# Patient Record
Sex: FEMALE | Race: WHITE | NOT HISPANIC OR LATINO | Employment: OTHER | URBAN - METROPOLITAN AREA
[De-identification: names, ages, dates, MRNs, and addresses within clinical notes are randomized per-mention and may not be internally consistent; named-entity substitution may affect disease eponyms.]

---

## 2021-04-09 ENCOUNTER — OFFICE VISIT (OUTPATIENT)
Dept: URGENT CARE | Facility: CLINIC | Age: 66
End: 2021-04-09

## 2021-04-09 VITALS
HEART RATE: 84 BPM | TEMPERATURE: 97 F | OXYGEN SATURATION: 97 % | DIASTOLIC BLOOD PRESSURE: 94 MMHG | BODY MASS INDEX: 32.14 KG/M2 | WEIGHT: 200 LBS | SYSTOLIC BLOOD PRESSURE: 161 MMHG | HEIGHT: 66 IN

## 2021-04-09 DIAGNOSIS — G47.00 INSOMNIA, UNSPECIFIED TYPE: ICD-10-CM

## 2021-04-09 DIAGNOSIS — Z78.0 POST-MENOPAUSAL: ICD-10-CM

## 2021-04-09 DIAGNOSIS — F41.9 ANXIETY: Primary | ICD-10-CM

## 2021-04-09 PROCEDURE — 99204 PR OFFICE/OUTPT VISIT, NEW, LEVL IV, 45-59 MIN: ICD-10-PCS | Mod: S$GLB,,, | Performed by: NURSE PRACTITIONER

## 2021-04-09 PROCEDURE — 99204 OFFICE O/P NEW MOD 45 MIN: CPT | Mod: S$GLB,,, | Performed by: NURSE PRACTITIONER

## 2021-04-09 RX ORDER — SERTRALINE HYDROCHLORIDE 25 MG/1
25 TABLET, FILM COATED ORAL DAILY
Qty: 30 TABLET | Refills: 3 | Status: SHIPPED | OUTPATIENT
Start: 2021-04-09 | End: 2022-07-06

## 2021-04-09 RX ORDER — CYCLOBENZAPRINE HCL 5 MG
5 TABLET ORAL NIGHTLY PRN
Qty: 30 TABLET | Refills: 3 | Status: SHIPPED | OUTPATIENT
Start: 2021-04-09 | End: 2022-07-06

## 2021-06-03 ENCOUNTER — OFFICE VISIT (OUTPATIENT)
Dept: URGENT CARE | Facility: CLINIC | Age: 66
End: 2021-06-03
Payer: MEDICAID

## 2021-06-03 VITALS
BODY MASS INDEX: 31.21 KG/M2 | DIASTOLIC BLOOD PRESSURE: 79 MMHG | WEIGHT: 194.19 LBS | HEIGHT: 66 IN | SYSTOLIC BLOOD PRESSURE: 152 MMHG | OXYGEN SATURATION: 96 % | TEMPERATURE: 99 F | HEART RATE: 83 BPM

## 2021-06-03 DIAGNOSIS — Z13.9 ENCOUNTER FOR MEDICAL SCREENING EXAMINATION: Primary | ICD-10-CM

## 2021-06-03 PROCEDURE — 99214 PR OFFICE/OUTPT VISIT, EST, LEVL IV, 30-39 MIN: ICD-10-PCS | Mod: S$GLB,,, | Performed by: NURSE PRACTITIONER

## 2021-06-03 PROCEDURE — 99214 OFFICE O/P EST MOD 30 MIN: CPT | Mod: S$GLB,,, | Performed by: NURSE PRACTITIONER

## 2021-06-30 ENCOUNTER — OFFICE VISIT (OUTPATIENT)
Dept: FAMILY MEDICINE | Facility: CLINIC | Age: 66
End: 2021-06-30
Payer: MEDICAID

## 2021-06-30 VITALS
HEIGHT: 66 IN | BODY MASS INDEX: 31.53 KG/M2 | OXYGEN SATURATION: 97 % | TEMPERATURE: 99 F | SYSTOLIC BLOOD PRESSURE: 128 MMHG | DIASTOLIC BLOOD PRESSURE: 76 MMHG | RESPIRATION RATE: 20 BRPM | HEART RATE: 84 BPM | WEIGHT: 196.19 LBS

## 2021-06-30 DIAGNOSIS — N95.1 MENOPAUSAL VAGINAL DRYNESS: ICD-10-CM

## 2021-06-30 DIAGNOSIS — Z13.6 ENCOUNTER FOR LIPID SCREENING FOR CARDIOVASCULAR DISEASE: ICD-10-CM

## 2021-06-30 DIAGNOSIS — Z12.31 ENCOUNTER FOR SCREENING MAMMOGRAM FOR MALIGNANT NEOPLASM OF BREAST: ICD-10-CM

## 2021-06-30 DIAGNOSIS — R53.83 FATIGUE, UNSPECIFIED TYPE: ICD-10-CM

## 2021-06-30 DIAGNOSIS — Z11.59 ENCOUNTER FOR HEPATITIS C SCREENING TEST FOR LOW RISK PATIENT: ICD-10-CM

## 2021-06-30 DIAGNOSIS — Z13.220 ENCOUNTER FOR LIPID SCREENING FOR CARDIOVASCULAR DISEASE: ICD-10-CM

## 2021-06-30 DIAGNOSIS — Z12.11 COLON CANCER SCREENING: ICD-10-CM

## 2021-06-30 DIAGNOSIS — Z00.00 ENCOUNTER FOR MEDICAL EXAMINATION TO ESTABLISH CARE: Primary | ICD-10-CM

## 2021-06-30 DIAGNOSIS — Z11.4 ENCOUNTER FOR SCREENING FOR HUMAN IMMUNODEFICIENCY VIRUS (HIV): ICD-10-CM

## 2021-06-30 DIAGNOSIS — Z13.1 DIABETES MELLITUS SCREENING: ICD-10-CM

## 2021-06-30 PROCEDURE — 99205 OFFICE O/P NEW HI 60 MIN: CPT | Performed by: FAMILY MEDICINE

## 2021-06-30 PROCEDURE — 99203 OFFICE O/P NEW LOW 30 MIN: CPT | Mod: S$PBB,,, | Performed by: FAMILY MEDICINE

## 2021-06-30 PROCEDURE — 99203 PR OFFICE/OUTPT VISIT, NEW, LEVL III, 30-44 MIN: ICD-10-PCS | Mod: S$PBB,,, | Performed by: FAMILY MEDICINE

## 2021-07-12 ENCOUNTER — PATIENT MESSAGE (OUTPATIENT)
Dept: FAMILY MEDICINE | Facility: CLINIC | Age: 66
End: 2021-07-12

## 2021-07-13 ENCOUNTER — PATIENT MESSAGE (OUTPATIENT)
Dept: FAMILY MEDICINE | Facility: CLINIC | Age: 66
End: 2021-07-13

## 2021-07-15 ENCOUNTER — HOSPITAL ENCOUNTER (OUTPATIENT)
Dept: RADIOLOGY | Facility: HOSPITAL | Age: 66
Discharge: HOME | End: 2021-07-15
Attending: FAMILY MEDICINE
Payer: MEDICAID

## 2021-07-15 VITALS — HEIGHT: 66 IN | BODY MASS INDEX: 31.67 KG/M2

## 2021-07-15 DIAGNOSIS — Z12.31 ENCOUNTER FOR SCREENING MAMMOGRAM FOR MALIGNANT NEOPLASM OF BREAST: ICD-10-CM

## 2021-07-15 PROCEDURE — 77067 SCR MAMMO BI INCL CAD: CPT | Mod: TC,PO

## 2021-07-22 ENCOUNTER — TELEPHONE (OUTPATIENT)
Dept: FAMILY MEDICINE | Facility: CLINIC | Age: 66
End: 2021-07-22

## 2022-07-06 ENCOUNTER — OFFICE VISIT (OUTPATIENT)
Dept: URGENT CARE | Facility: CLINIC | Age: 67
End: 2022-07-06
Payer: COMMERCIAL

## 2022-07-06 VITALS
SYSTOLIC BLOOD PRESSURE: 135 MMHG | TEMPERATURE: 98 F | HEIGHT: 66 IN | DIASTOLIC BLOOD PRESSURE: 80 MMHG | WEIGHT: 204 LBS | OXYGEN SATURATION: 97 % | BODY MASS INDEX: 32.78 KG/M2 | RESPIRATION RATE: 16 BRPM | HEART RATE: 64 BPM

## 2022-07-06 DIAGNOSIS — R11.0 NAUSEA: ICD-10-CM

## 2022-07-06 DIAGNOSIS — K57.92 DIVERTICULITIS: ICD-10-CM

## 2022-07-06 DIAGNOSIS — R10.9 LEFT SIDED ABDOMINAL PAIN: Primary | ICD-10-CM

## 2022-07-06 PROCEDURE — 99213 OFFICE O/P EST LOW 20 MIN: CPT | Mod: S$GLB,,,

## 2022-07-06 PROCEDURE — 99213 PR OFFICE/OUTPT VISIT, EST, LEVL III, 20-29 MIN: ICD-10-PCS | Mod: S$GLB,,,

## 2022-07-06 RX ORDER — AMOXICILLIN AND CLAVULANATE POTASSIUM 875; 125 MG/1; MG/1
1 TABLET, FILM COATED ORAL EVERY 12 HOURS
Qty: 20 TABLET | Refills: 0 | Status: SHIPPED | OUTPATIENT
Start: 2022-07-06 | End: 2022-07-16

## 2022-07-06 RX ORDER — ONDANSETRON 4 MG/1
4 TABLET, ORALLY DISINTEGRATING ORAL EVERY 8 HOURS PRN
Qty: 24 TABLET | Refills: 0 | Status: SHIPPED | OUTPATIENT
Start: 2022-07-06 | End: 2022-07-14

## 2022-07-06 NOTE — PATIENT INSTRUCTIONS
If you develop worsening of symptoms, abdominal pain, vomiting that is not relieved with medication, fever not lowered with OTC medication, go straight to the emergency room for further work up and evaluation.     You do not need to avoid seeds, nuts, corn, or other similar foods.  You will need to eat food rich in fiber and drink more water.  Eat 5 or more servings of fresh fruits and vegetables every day.  Eat 6 or more servings of whole-wheat grain breads and cereals.  Try to get 25 to 30 grams of fiber every day. Read the labels to learn how much fiber is in foods.  Do not drink beer, wine, and mixed drinks (alcohol).    Follow up with your PCP within the next week, sooner if symptoms worsen.

## 2022-07-06 NOTE — PROGRESS NOTES
"Subjective:       Patient ID: Melisa Segovia is a 67 y.o. female.    Vitals:  height is 5' 6" (1.676 m) and weight is 92.5 kg (204 lb). Her oral temperature is 97.7 °F (36.5 °C). Her blood pressure is 135/80 and her pulse is 64. Her respiration is 16 and oxygen saturation is 97%.     Chief Complaint: Nausea    Patient states she has a little flare up of diverticulitis. States does not have any abx. Has been doing a liquid diet at home. Symptoms include pain in lower left side, bloated, nauseous X 2 days. Has a history of diverticulitis.       Constitution: Negative for activity change, appetite change, chills, sweating and fever.   HENT: Negative for ear pain, sinus pain, sinus pressure and sore throat.    Neck: Negative for neck pain.   Cardiovascular: Negative for chest pain.   Eyes: Negative for blurred vision.   Respiratory: Negative for chest tightness, cough and shortness of breath.    Gastrointestinal: Positive for abdominal pain (left lower quandrant) and nausea. Negative for vomiting, constipation, diarrhea, bright red blood in stool and dark colored stools.   Neurological: Negative for dizziness and history of vertigo.       Objective:      Physical Exam   Constitutional: She is oriented to person, place, and time.  Non-toxic appearance. She does not appear ill. No distress.   HENT:   Head: Normocephalic.   Nose: Nose normal.   Eyes: Conjunctivae are normal. Extraocular movement intact   Cardiovascular: Normal rate and normal pulses.   Pulmonary/Chest: Effort normal and breath sounds normal. No respiratory distress.   Abdominal: Normal appearance. She exhibits no distension. Soft. There is abdominal tenderness (dull 2/10 left sided lower abdomen). There is no rebound, no guarding, no left CVA tenderness and no right CVA tenderness.   Neurological: no focal deficit. She is alert and oriented to person, place, and time.   Skin: Skin is warm, dry and not diaphoretic. Capillary refill takes 2 to 3 seconds. "   Psychiatric: Her behavior is normal. Mood normal.         Assessment:       1. Left sided abdominal pain    2. Diverticulitis    3. Nausea          Plan:         Left sided abdominal pain    Diverticulitis  -     amoxicillin-clavulanate 875-125mg (AUGMENTIN) 875-125 mg per tablet; Take 1 tablet by mouth every 12 (twelve) hours. for 10 days  Dispense: 20 tablet; Refill: 0    Nausea  -     ondansetron (ZOFRAN-ODT) 4 MG TbDL; Take 1 tablet (4 mg total) by mouth every 8 (eight) hours as needed (Nausea).  Dispense: 24 tablet; Refill: 0         Patient presents with left sided abdominal pain x 2 days that states symptoms are consisent with previous diverticulitis episodes, she reports has not had a flare up in a few years, she started liquid diet two days ago when symptoms started, she is having normal formed BM's daily, tolerating po liquids and solids, denies fever, chills, vomiting. She has occasional nausea. Denies perforation in the past. Reports she responds well to Augmentin for her flare ups, she does not tolerate po metrodionzale and cipro due to nausea and diarrhea.     Patient instructed to FU with her PCP this week, will treat empirically and strict ED precautions given if symptoms worsen.

## 2022-07-19 DIAGNOSIS — Z12.31 ENCOUNTER FOR SCREENING MAMMOGRAM FOR MALIGNANT NEOPLASM OF BREAST: Primary | ICD-10-CM

## 2022-08-02 ENCOUNTER — HOSPITAL ENCOUNTER (OUTPATIENT)
Dept: RADIOLOGY | Facility: HOSPITAL | Age: 67
Discharge: HOME | End: 2022-08-02
Attending: FAMILY MEDICINE
Payer: COMMERCIAL

## 2022-08-02 DIAGNOSIS — Z12.31 ENCOUNTER FOR SCREENING MAMMOGRAM FOR MALIGNANT NEOPLASM OF BREAST: ICD-10-CM

## 2022-08-02 PROCEDURE — 77067 SCR MAMMO BI INCL CAD: CPT | Mod: TC,PO

## 2022-08-02 PROCEDURE — 77063 BREAST TOMOSYNTHESIS BI: CPT | Mod: TC,PO

## 2023-11-09 DIAGNOSIS — Z12.31 BREAST CANCER SCREENING BY MAMMOGRAM: Primary | ICD-10-CM

## 2023-12-05 ENCOUNTER — HOSPITAL ENCOUNTER (OUTPATIENT)
Dept: RADIOLOGY | Facility: HOSPITAL | Age: 68
Discharge: HOME | End: 2023-12-05
Attending: INTERNAL MEDICINE
Payer: MEDICARE

## 2023-12-05 ENCOUNTER — HOSPITAL ENCOUNTER (OUTPATIENT)
Dept: RADIOLOGY | Facility: HOSPITAL | Age: 68
Discharge: HOME | End: 2023-12-05
Attending: OBSTETRICS & GYNECOLOGY
Payer: MEDICARE

## 2023-12-05 DIAGNOSIS — R09.3 ABNORMAL SPUTUM: Primary | ICD-10-CM

## 2023-12-05 DIAGNOSIS — R09.3 ABNORMAL SPUTUM: ICD-10-CM

## 2023-12-05 DIAGNOSIS — Z12.31 BREAST CANCER SCREENING BY MAMMOGRAM: ICD-10-CM

## 2023-12-05 DIAGNOSIS — R05.9 COUGH: ICD-10-CM

## 2023-12-05 PROCEDURE — 71046 X-RAY EXAM CHEST 2 VIEWS: CPT | Mod: TC,PO

## 2023-12-05 PROCEDURE — 77067 SCR MAMMO BI INCL CAD: CPT | Mod: TC,PO

## 2024-06-27 ENCOUNTER — TELEPHONE (OUTPATIENT)
Dept: ORTHOPEDICS | Facility: CLINIC | Age: 69
End: 2024-06-27
Payer: MEDICARE

## 2024-06-27 NOTE — TELEPHONE ENCOUNTER
----- Message from Lauren Abadie, LPN sent at 6/27/2024  3:01 PM CDT -----    ----- Message -----  From: Ish Bellamy  Sent: 6/27/2024   3:00 PM CDT  To: Treasure Barrientos    Good afternoon,     The pt listed above is being referred from Jessica Palma to Dr. Amanda for (right medial meniscus tear). I have scanned the referral and records into . Please advise or contact pt to schedule appt at your earliest convenience.     Thank You,     Ish Bellamy  [facility]

## 2024-07-18 ENCOUNTER — HOSPITAL ENCOUNTER (OUTPATIENT)
Dept: RADIOLOGY | Facility: HOSPITAL | Age: 69
Discharge: HOME | End: 2024-07-18
Attending: ORTHOPAEDIC SURGERY
Payer: MEDICARE

## 2024-07-18 ENCOUNTER — OFFICE VISIT (OUTPATIENT)
Dept: ORTHOPEDICS | Facility: CLINIC | Age: 69
End: 2024-07-18
Payer: MEDICARE

## 2024-07-18 VITALS — BODY MASS INDEX: 32.78 KG/M2 | WEIGHT: 203.94 LBS | HEIGHT: 66 IN

## 2024-07-18 DIAGNOSIS — M23.203 DEGENERATIVE TEAR OF MEDIAL MENISCUS OF RIGHT KNEE: ICD-10-CM

## 2024-07-18 DIAGNOSIS — M22.41 CHONDROMALACIA PATELLAE, RIGHT: Primary | ICD-10-CM

## 2024-07-18 PROCEDURE — 73562 X-RAY EXAM OF KNEE 3: CPT | Mod: 26,RT,, | Performed by: RADIOLOGY

## 2024-07-18 PROCEDURE — 99999 PR PBB SHADOW E&M-EST. PATIENT-LVL III: CPT | Mod: PBBFAC,,, | Performed by: ORTHOPAEDIC SURGERY

## 2024-07-18 PROCEDURE — 73562 X-RAY EXAM OF KNEE 3: CPT | Mod: TC,PN,RT

## 2024-07-18 RX ORDER — ESTRADIOL 0.5 MG/.5G
GEL TOPICAL
COMMUNITY

## 2024-07-18 RX ORDER — MIRTAZAPINE 15 MG/1
TABLET, FILM COATED ORAL
COMMUNITY
Start: 2024-07-17

## 2024-07-18 RX ORDER — FUROSEMIDE 20 MG/1
20 TABLET ORAL
COMMUNITY
Start: 2024-06-21

## 2024-07-18 RX ORDER — PROGESTERONE 100 MG/1
CAPSULE ORAL
COMMUNITY

## 2024-07-18 NOTE — PROGRESS NOTES
[facility]    Subjective:     Chief Complaint:   Chief Complaint   Patient presents with    Right Knee - Pain     Patient is here with complaints of Right knee pain x 1 year, states she fell and hit knee on the side of a tanning bed, was in about 3 weeks later due to a toe fracture due to a separate fall. Has had one injection in the knee that lasted around 2 months, painful to go down stairs, has some popping, feels a pulling in there lower leg when climbing stairs, MRI show meniscus tear, in PT for the knee, states it is helping       Past Medical History:   Diagnosis Date    Chronic fatigue     Diverticulitis     Fibromyalgia     IBS (irritable bowel syndrome)     TMJ (dislocation of temporomandibular joint)        Past Surgical History:   Procedure Laterality Date    APPENDECTOMY      BLADDER SUSPENSION      CHOLECYSTECTOMY      HYSTERECTOMY      TUBAL LIGATION         Current Outpatient Medications   Medication Sig    conjugated estrogens (PREMARIN) vaginal cream Place 0.5 g vaginally twice a week.    estradioL (DIVIGEL) 0.5 mg/0.5 gram (0.1 %) GlPk     furosemide (LASIX) 20 MG tablet Take 20 mg by mouth.    mirtazapine (REMERON) 15 MG tablet     progesterone (PROMETRIUM) 100 MG capsule 50 mg PO QHS    UNABLE TO FIND medication name: EC-RX Testosterone 0.2 % transdermal cream compounding kit     No current facility-administered medications for this visit.       Review of patient's allergies indicates:   Allergen Reactions    Codeine        Family History   Problem Relation Name Age of Onset    Diabetes Mother      Thyroid disease Mother      Diabetes Father         Social History     Socioeconomic History    Marital status:    Tobacco Use    Smoking status: Former     Current packs/day: 0.00     Types: Cigarettes     Quit date: 2/27/2021     Years since quitting: 3.3    Smokeless tobacco: Never   Substance and Sexual Activity    Alcohol use: Not Currently     Comment: occ    Drug use: Never        History of present illness:  Patient comes in today for the right knee.  She has had right knee pain primarily when she gets up from a chair goes down stairs for several months.  She did get an injection of proximally 8 months ago and that helped for small period of time.  She saw her primary care doctor who appropriately and clearly ordered an MRI.  The MRI demonstrated a meniscal tear primarily on the medial meniscus and also full-thickness cartilage loss on the patella.  She comes in today for evaluation.  Her pain has actually improved.  Her symptoms have improved      Review of Systems:    Constitution: Negative for chills, fever, and sweats.  Negative for unexplained weight loss.    HENT:  Negative for headaches and blurry vision.    Cardiovascular:Negative for chest pain or irregular heart beat. Negative for hypertension.    Respiratory:  Negative for cough and shortness of breath.    Gastrointestinal: Negative for abdominal pain, heartburn, melena, nausea, and vomitting.    Genitourinary:  Negative bladder incontinence and dysuria.    Musculoskeletal:  See HPI for details.     Neurological: Negative for numbness.    Psychiatric/Behavioral: Negative for depression.  The patient is not nervous/anxious.      Endocrine: Negative for polyuria    Hematologic/Lymphatic: Negative for bleeding problem.  Does not bruise/bleed easily.    Skin: Negative for poor would healing and rash    Objective:      Physical Examination:    Vital Signs:  There were no vitals filed for this visit.    Body mass index is 32.91 kg/m².    This a well-developed, well nourished patient in no acute distress.  They are alert and oriented and cooperative to examination.        Patient appears to be stated age she has full range motion of her bilateral knees she has no effusion today she does have significant anterior crepitus on the right side she has medial joint line tenderness and a positive Jorge's  Pertinent New Results:  MRIs  attached and reviewed  XRAY Report / Interpretation:   X-rays reviewed    Assessment/Plan:      Chondromalacia patella right knee advanced.  Medial meniscal tear right knee.  Because she is not symptomatic at this time we are not going to intervene.  If she develops pain or instability she will return to the office immediately.  At that point we may proceed with an intra-articular injection.  Future care may require a arthroscopy to deal with the meniscus.  The chondromalacia patella in her case is advanced enough that if that needs to be dealt with it would have to be with arthroplasty surgery.  All this is discussed with her in great detail she will follow-up p.r.n.      This note was created using Dragon voice recognition software that occasionally misinterpreted phrases or words.

## 2024-09-05 DIAGNOSIS — Z78.0 POST-MENOPAUSAL: ICD-10-CM

## 2024-09-05 DIAGNOSIS — Z12.31 BREAST CANCER SCREENING BY MAMMOGRAM: Primary | ICD-10-CM

## 2024-12-09 ENCOUNTER — HOSPITAL ENCOUNTER (OUTPATIENT)
Dept: RADIOLOGY | Facility: HOSPITAL | Age: 69
Discharge: HOME | End: 2024-12-09
Attending: INTERNAL MEDICINE
Payer: MEDICARE

## 2024-12-09 DIAGNOSIS — Z12.31 BREAST CANCER SCREENING BY MAMMOGRAM: ICD-10-CM

## 2024-12-09 DIAGNOSIS — Z78.0 POST-MENOPAUSAL: ICD-10-CM

## 2024-12-09 PROCEDURE — 77080 DXA BONE DENSITY AXIAL: CPT | Mod: TC,PO

## 2024-12-09 PROCEDURE — 77063 BREAST TOMOSYNTHESIS BI: CPT | Mod: 26,,, | Performed by: RADIOLOGY

## 2024-12-09 PROCEDURE — 77080 DXA BONE DENSITY AXIAL: CPT | Mod: 26,,, | Performed by: RADIOLOGY

## 2024-12-09 PROCEDURE — 77067 SCR MAMMO BI INCL CAD: CPT | Mod: 26,,, | Performed by: RADIOLOGY

## 2024-12-09 PROCEDURE — 77063 BREAST TOMOSYNTHESIS BI: CPT | Mod: TC,PO
